# Patient Record
Sex: MALE | Race: WHITE | Employment: FULL TIME | URBAN - METROPOLITAN AREA
[De-identification: names, ages, dates, MRNs, and addresses within clinical notes are randomized per-mention and may not be internally consistent; named-entity substitution may affect disease eponyms.]

---

## 2021-06-29 ENCOUNTER — OFFICE VISIT (OUTPATIENT)
Dept: FAMILY MEDICINE | Facility: CLINIC | Age: 46
End: 2021-06-29
Payer: COMMERCIAL

## 2021-06-29 VITALS
OXYGEN SATURATION: 98 % | HEIGHT: 69 IN | DIASTOLIC BLOOD PRESSURE: 86 MMHG | TEMPERATURE: 98 F | WEIGHT: 150 LBS | BODY MASS INDEX: 22.22 KG/M2 | SYSTOLIC BLOOD PRESSURE: 132 MMHG | HEART RATE: 98 BPM
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
## 2021-06-29 PROCEDURE — 99202 OFFICE O/P NEW SF 15 MIN: CPT | Performed by: PHYSICIAN ASSISTANT

## 2021-06-29 ASSESSMENT — MIFFLIN-ST. JEOR: SCORE: 1550.78
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
## 2021-06-29 NOTE — LETTER
[facility]  [address] [address]  Phone: [phone]    June 29, 2021        Jordan Garcia  [address] [address] [facility] [address]          To whom it may concern:    RE: Jordan Garcia    Patient was seen and treated today at our clinic.  Patient may return to work 6/30/21 with the following:  No working or lifting restrictions    Please contact me for questions or concerns.      Sincerely,        Kristen M. Kehr, PA-C

## 2021-06-29 NOTE — NURSING NOTE
"Chief Complaint   Patient presents with     Letter for School/Work     work note       Initial BP (!) 159/99   Pulse 98   Temp 98  F (36.7  C) (Tympanic)   Ht 1.753 m (5' 9\")   Wt 68 kg (150 lb)   SpO2 98%   BMI 22.15 kg/m   Estimated body mass index is 22.15 kg/m  as calculated from the following:    Height as of this encounter: 1.753 m (5' 9\").    Weight as of this encounter: 68 kg (150 lb).  Medication Reconciliation: complete'    CORTEZ Olivia MA    "

## 2021-10-28 ENCOUNTER — OFFICE VISIT (OUTPATIENT)
Dept: FAMILY MEDICINE | Facility: CLINIC | Age: 46
End: 2021-10-28
Payer: COMMERCIAL

## 2021-10-28 VITALS
DIASTOLIC BLOOD PRESSURE: 83 MMHG | BODY MASS INDEX: 23.25 KG/M2 | OXYGEN SATURATION: 98 % | HEIGHT: 69 IN | SYSTOLIC BLOOD PRESSURE: 151 MMHG | TEMPERATURE: 98.4 F | HEART RATE: 111 BPM | RESPIRATION RATE: 20 BRPM | WEIGHT: 157 LBS

## 2021-10-28 DIAGNOSIS — Z71.89 ADVANCE CARE PLANNING: Primary | ICD-10-CM

## 2021-10-28 DIAGNOSIS — S05.51XD INTRAOCULAR FOREIGN BODY OF RIGHT EYE, SUBSEQUENT ENCOUNTER: ICD-10-CM

## 2021-10-28 DIAGNOSIS — Z09 FOLLOW UP: ICD-10-CM

## 2021-10-28 PROCEDURE — 99213 OFFICE O/P EST LOW 20 MIN: CPT | Performed by: NURSE PRACTITIONER

## 2021-10-28 ASSESSMENT — MIFFLIN-ST. JEOR: SCORE: 1582.78

## 2021-10-28 ASSESSMENT — PAIN SCALES - GENERAL: PAINLEVEL: NO PAIN (0)

## 2021-10-28 NOTE — PATIENT INSTRUCTIONS
Patient Education     Particle in the Eye  The conjunctiva is a thin membrane in the eye. It covers the white of the eye and the inside of the eyelid. A very small object such as an eyelash or dirt can become trapped under the eyelid. This is called a conjunctival foreign body. This can be very irritating to the eye, no matter how small the object is. If the exam shows that you no longer have a particle in your eye, any discomfort should go away within the next 24 hours.   Home care     Hold a cool compress over the sore eye to relieve pain and swelling.     Put a cool compress on the eye that hurts. A cool compress is a towel soaked in cool water. Do this 3 to 4 times a day. It will help ease redness and swelling.    You can use artificial tears to ease irritation and redness, unless another medicine was prescribed. You can buy artificial tears at drugstores without a prescription.    You can use over-the-counter pain medicine such as acetaminophen or ibuprofen to control pain, unless another pain medicine was prescribed. Talk with your doctor before using these medicines if you have chronic liver or kidney disease, or if ever had a stomach ulcer or digestive bleeding..    If the foreign body causes a scratch on your cornea, the healthcare provider will likely prescribe an antibiotic eye drop.    Follow-up care  Follow up with your healthcare provider, or as advised.  When to seek medical advice  Contact your healthcare provider right away if any of these occur:    Increased swelling of the eyelid    Increased pain or redness in the eye    Drainage from the eye    Redness in the skin around the eye  [employer] last reviewed this educational content on 8/1/2019 2000-2021 [employer]. All rights reserved. This information is not intended as a substitute for professional medical care. Always follow your healthcare professional's instructions.

## 2021-10-28 NOTE — PROGRESS NOTES
"  Assessment & Plan     Advance care planning      Follow up      Intraocular foreign body of right eye, subsequent encounter    20 minutes spent on the date of the encounter doing chart review, history and exam, documentation and further activities per the note     Tobacco Cessation:   reports that he has been smoking cigarettes. He has never used smokeless tobacco.  Tobacco Cessation Action Plan: Information offered: Patient not interested at this time    See Patient Instructions: discussed follow up if needed.    Return in about 3 months (around 1/28/2022), or if symptoms worsen or fail to improve.    Denisha Barajas, PETE  [facility] SARAH Ortega is a 46 year old who presents for the following health issues     HPI     Patient needs letter to return to work  Had something go into his eye. Called in. Work now needs letter to go back to work.    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  Was grinding something at home without eye protection, piece of hot metal went into R eye. Fused to contact. He was able to get contact out fully himself.  Went to eye doctor Thursday, they cleared him to return to work but he needs a doctors letter. He went without a contact for a week, has now been wearing a contact for almost a week. Denies any pain, discharge or vision changes.    Review of Systems   Constitutional, HEENT, cardiovascular, pulmonary, GI, , musculoskeletal, neuro, skin, endocrine and psych systems are negative, except as otherwise noted.      Objective    BP (!) 151/83   Pulse 111   Temp 98.4  F (36.9  C) (Tympanic)   Resp 20   Ht 1.753 m (5' 9.02\")   Wt 71.2 kg (157 lb)   SpO2 98%   BMI 23.17 kg/m    Body mass index is 23.17 kg/m .  Physical Exam   GENERAL: Healthy, alert and no distress  EYES: Eyes grossly normal to inspection.  No discharge or erythema, or obvious scleral/conjunctival abnormalities.  RESP: No audible wheeze, cough, " or visible cyanosis.  No visible retractions or increased work of breathing.    SKIN: Visible skin clear. No significant rash, abnormal pigmentation or lesions.  NEURO: Cranial nerves grossly intact.  Mentation and speech appropriate for age.  PSYCH: Mentation appears normal, affect normal/bright, judgement and insight intact, normal speech and appearance well-groomed.

## 2021-10-28 NOTE — LETTER
[facility]  [address] [address]  [phone]          October 28, 2021    RE:  Jordan Garcia                                                                                                                                                       [address] [address] [facility] [address]            To whom it may concern:    Jordan Garcia is under my professional care, seen for evaluation 10/28/21.  He may return to work without restrictions 11/1/21.    Sincerely,        Denisha Barajas RN, CNP